# Patient Record
Sex: MALE | Race: WHITE | NOT HISPANIC OR LATINO | URBAN - METROPOLITAN AREA
[De-identification: names, ages, dates, MRNs, and addresses within clinical notes are randomized per-mention and may not be internally consistent; named-entity substitution may affect disease eponyms.]

---

## 2023-04-24 LAB
ALANINE AMINOTRANSFERASE (SGPT) (U/L) IN SER/PLAS: 24 U/L (ref 10–52)
ALBUMIN (G/DL) IN SER/PLAS: 4.3 G/DL (ref 3.4–5)
ALKALINE PHOSPHATASE (U/L) IN SER/PLAS: 60 U/L (ref 33–120)
ANION GAP IN SER/PLAS: 12 MMOL/L (ref 10–20)
APPEARANCE, URINE: CLEAR
ASPARTATE AMINOTRANSFERASE (SGOT) (U/L) IN SER/PLAS: 15 U/L (ref 9–39)
BASOPHILS (10*3/UL) IN BLOOD BY AUTOMATED COUNT: 0.07 X10E9/L (ref 0–0.1)
BASOPHILS/100 LEUKOCYTES IN BLOOD BY AUTOMATED COUNT: 0.9 % (ref 0–2)
BILIRUBIN TOTAL (MG/DL) IN SER/PLAS: 0.6 MG/DL (ref 0–1.2)
BILIRUBIN, URINE: NEGATIVE
BLOOD, URINE: NEGATIVE
CALCIUM (MG/DL) IN SER/PLAS: 9.6 MG/DL (ref 8.6–10.6)
CARBON DIOXIDE, TOTAL (MMOL/L) IN SER/PLAS: 29 MMOL/L (ref 21–32)
CHLORIDE (MMOL/L) IN SER/PLAS: 102 MMOL/L (ref 98–107)
CHOLESTEROL (MG/DL) IN SER/PLAS: 260 MG/DL (ref 0–199)
CHOLESTEROL IN HDL (MG/DL) IN SER/PLAS: 46.1 MG/DL
CHOLESTEROL/HDL RATIO: 5.6
COLOR, URINE: NORMAL
CREATININE (MG/DL) IN SER/PLAS: 1.06 MG/DL (ref 0.5–1.3)
EOSINOPHILS (10*3/UL) IN BLOOD BY AUTOMATED COUNT: 0.21 X10E9/L (ref 0–0.7)
EOSINOPHILS/100 LEUKOCYTES IN BLOOD BY AUTOMATED COUNT: 2.8 % (ref 0–6)
ERYTHROCYTE DISTRIBUTION WIDTH (RATIO) BY AUTOMATED COUNT: 12.7 % (ref 11.5–14.5)
ERYTHROCYTE MEAN CORPUSCULAR HEMOGLOBIN CONCENTRATION (G/DL) BY AUTOMATED: 32.8 G/DL (ref 32–36)
ERYTHROCYTE MEAN CORPUSCULAR VOLUME (FL) BY AUTOMATED COUNT: 93 FL (ref 80–100)
ERYTHROCYTES (10*6/UL) IN BLOOD BY AUTOMATED COUNT: 5.13 X10E12/L (ref 4.5–5.9)
GFR MALE: 81 ML/MIN/1.73M2
GLUCOSE (MG/DL) IN SER/PLAS: 94 MG/DL (ref 74–99)
GLUCOSE, URINE: NEGATIVE MG/DL
HEMATOCRIT (%) IN BLOOD BY AUTOMATED COUNT: 47.6 % (ref 41–52)
HEMOGLOBIN (G/DL) IN BLOOD: 15.6 G/DL (ref 13.5–17.5)
IMMATURE GRANULOCYTES/100 LEUKOCYTES IN BLOOD BY AUTOMATED COUNT: 0.3 % (ref 0–0.9)
KETONES, URINE: NEGATIVE MG/DL
LDL: 166 MG/DL (ref 0–99)
LEUKOCYTE ESTERASE, URINE: NEGATIVE
LEUKOCYTES (10*3/UL) IN BLOOD BY AUTOMATED COUNT: 7.6 X10E9/L (ref 4.4–11.3)
LYMPHOCYTES (10*3/UL) IN BLOOD BY AUTOMATED COUNT: 1.49 X10E9/L (ref 1.2–4.8)
LYMPHOCYTES/100 LEUKOCYTES IN BLOOD BY AUTOMATED COUNT: 19.6 % (ref 13–44)
MONOCYTES (10*3/UL) IN BLOOD BY AUTOMATED COUNT: 0.73 X10E9/L (ref 0.1–1)
MONOCYTES/100 LEUKOCYTES IN BLOOD BY AUTOMATED COUNT: 9.6 % (ref 2–10)
NEUTROPHILS (10*3/UL) IN BLOOD BY AUTOMATED COUNT: 5.1 X10E9/L (ref 1.2–7.7)
NEUTROPHILS/100 LEUKOCYTES IN BLOOD BY AUTOMATED COUNT: 66.8 % (ref 40–80)
NITRITE, URINE: NEGATIVE
NON HDL CHOLESTEROL: 214 MG/DL
NRBC (PER 100 WBCS) BY AUTOMATED COUNT: 0 /100 WBC (ref 0–0)
PH, URINE: 6 (ref 5–8)
PLATELETS (10*3/UL) IN BLOOD AUTOMATED COUNT: 270 X10E9/L (ref 150–450)
POTASSIUM (MMOL/L) IN SER/PLAS: 5 MMOL/L (ref 3.5–5.3)
PROSTATE SPECIFIC AG (NG/ML) IN SER/PLAS: 4.46 NG/ML (ref 0–4)
PROTEIN TOTAL: 6.7 G/DL (ref 6.4–8.2)
PROTEIN, URINE: NEGATIVE MG/DL
SODIUM (MMOL/L) IN SER/PLAS: 138 MMOL/L (ref 136–145)
SPECIFIC GRAVITY, URINE: 1 (ref 1–1.03)
TRIGLYCERIDE (MG/DL) IN SER/PLAS: 242 MG/DL (ref 0–149)
UREA NITROGEN (MG/DL) IN SER/PLAS: 15 MG/DL (ref 6–23)
UROBILINOGEN, URINE: <2 MG/DL (ref 0–1.9)
VLDL: 48 MG/DL (ref 0–40)

## 2024-01-18 ENCOUNTER — LAB (OUTPATIENT)
Dept: LAB | Facility: LAB | Age: 59
End: 2024-01-18
Payer: COMMERCIAL

## 2024-01-18 DIAGNOSIS — R97.20 ELEVATED PROSTATE SPECIFIC ANTIGEN (PSA): Primary | ICD-10-CM

## 2024-01-18 PROCEDURE — 84154 ASSAY OF PSA FREE: CPT

## 2024-01-18 PROCEDURE — 84153 ASSAY OF PSA TOTAL: CPT

## 2024-01-18 PROCEDURE — 36415 COLL VENOUS BLD VENIPUNCTURE: CPT

## 2024-01-21 LAB
PSA FREE MFR SERPL: 17 %
PSA FREE SERPL-MCNC: 0.6 NG/ML
PSA SERPL IA-MCNC: 3.6 NG/ML (ref 0–4)

## 2024-01-31 NOTE — PROGRESS NOTES
"FUV    Last seen - 7/28/23     HISTORY OF PRESENT ILLNESS:   Micah David is a 58 y.o. male who is being seen today for follow up and PSA results. No Fhx of prostate cancer.    PAST MEDICAL HISTORY:  Past Medical History:   Diagnosis Date    Other conditions influencing health status     Bone Density Studies Dual-Energy X-ray Absorptiometry       PAST SURGICAL HISTORY:  Past Surgical History:   Procedure Laterality Date    OTHER SURGICAL HISTORY  11/18/2014    Open Treatment Of Fracture Of Proximal Femoral Neck        ALLERGIES:   Not on File     MEDICATIONS:   No current outpatient medications     PHYSICAL EXAM:  There were no vitals taken for this visit.  Constitutional: Patient appears well-developed and well-nourished. No distress.    Pulmonary/Chest: Effort normal. No respiratory distress.   Abdominal: Soft, ND NT  : WNL  Musculoskeletal: Normal range of motion.    Neurological: Alert and oriented to person, place, and time.  Psychiatric: Normal mood and affect. Behavior is normal. Thought content normal.      Labs:  No results found for: \"TESTOSTERONE\"  Lab Results   Component Value Date    PSA 3.6 01/18/2024     No components found for: \"CBC\"  Lab Results   Component Value Date    CREATININE 1.06 04/24/2023     No components found for: \"TESTOTMS\"  No results found for: \"TESTF\"    Imaging:  - MRI of prostate on 7/17/23 was negative.   - Results reviewed with patient. Patient reassured.     Discussion:  Doing well, no complaints. Denies any dysuria, hematuria, bothersome urinary frequency, urgency, or flank pain. Patient denies any pushing or straining to urinate. No complaints of ED. Will continue to monitor PSA.    PVR 2cc    Assessment:    No diagnosis found.    Micah David is a 58 y.o. male here for FUV     Plan:   1) Follow up in 6 months with PSA panel prior.  2) All questions and concerns were addressed. Patient verbalizes understanding and has no other questions at this time. "     Scribe Attestation  By signing my name below, I, Krista Brock   attest that this documentation has been prepared under the direction and in the presence of Tomy Royal MD.

## 2024-02-01 ENCOUNTER — OFFICE VISIT (OUTPATIENT)
Dept: UROLOGY | Facility: HOSPITAL | Age: 59
End: 2024-02-01
Payer: COMMERCIAL

## 2024-02-01 DIAGNOSIS — R97.20 ELEVATED PSA: ICD-10-CM

## 2024-02-01 DIAGNOSIS — N40.0 BENIGN PROSTATIC HYPERPLASIA, UNSPECIFIED WHETHER LOWER URINARY TRACT SYMPTOMS PRESENT: Primary | ICD-10-CM

## 2024-02-01 PROCEDURE — 99213 OFFICE O/P EST LOW 20 MIN: CPT | Performed by: UROLOGY

## 2024-07-25 ENCOUNTER — APPOINTMENT (OUTPATIENT)
Dept: UROLOGY | Facility: HOSPITAL | Age: 59
End: 2024-07-25
Payer: COMMERCIAL

## 2024-07-25 ENCOUNTER — LAB (OUTPATIENT)
Dept: LAB | Facility: LAB | Age: 59
End: 2024-07-25
Payer: COMMERCIAL

## 2024-07-25 DIAGNOSIS — E78.2 MIXED HYPERLIPIDEMIA: ICD-10-CM

## 2024-07-25 DIAGNOSIS — E78.2 MIXED HYPERLIPIDEMIA: Primary | ICD-10-CM

## 2024-07-25 LAB
ALBUMIN SERPL BCP-MCNC: 4.3 G/DL (ref 3.4–5)
ALP SERPL-CCNC: 62 U/L (ref 33–120)
ALT SERPL W P-5'-P-CCNC: 24 U/L (ref 10–52)
ANION GAP SERPL CALC-SCNC: 9 MMOL/L (ref 10–20)
AST SERPL W P-5'-P-CCNC: 17 U/L (ref 9–39)
BILIRUB SERPL-MCNC: 0.4 MG/DL (ref 0–1.2)
BUN SERPL-MCNC: 18 MG/DL (ref 6–23)
CALCIUM SERPL-MCNC: 9.2 MG/DL (ref 8.6–10.6)
CHLORIDE SERPL-SCNC: 104 MMOL/L (ref 98–107)
CHOLEST SERPL-MCNC: 141 MG/DL (ref 0–199)
CHOLESTEROL/HDL RATIO: 2.9
CO2 SERPL-SCNC: 33 MMOL/L (ref 21–32)
CREAT SERPL-MCNC: 1.11 MG/DL (ref 0.5–1.3)
EGFRCR SERPLBLD CKD-EPI 2021: 76 ML/MIN/1.73M*2
GLUCOSE SERPL-MCNC: 94 MG/DL (ref 74–99)
HDLC SERPL-MCNC: 49.2 MG/DL
LDLC SERPL CALC-MCNC: 74 MG/DL
NON HDL CHOLESTEROL: 92 MG/DL (ref 0–149)
POTASSIUM SERPL-SCNC: 5.2 MMOL/L (ref 3.5–5.3)
PROT SERPL-MCNC: 6.6 G/DL (ref 6.4–8.2)
SODIUM SERPL-SCNC: 141 MMOL/L (ref 136–145)
TRIGL SERPL-MCNC: 88 MG/DL (ref 0–149)
VLDL: 18 MG/DL (ref 0–40)

## 2024-07-25 PROCEDURE — 80053 COMPREHEN METABOLIC PANEL: CPT

## 2024-07-25 PROCEDURE — 36415 COLL VENOUS BLD VENIPUNCTURE: CPT

## 2024-07-25 PROCEDURE — 80061 LIPID PANEL: CPT

## 2024-08-20 ENCOUNTER — LAB (OUTPATIENT)
Dept: LAB | Facility: LAB | Age: 59
End: 2024-08-20
Payer: COMMERCIAL

## 2024-08-20 DIAGNOSIS — R97.20 ELEVATED PSA: ICD-10-CM

## 2024-08-20 PROCEDURE — 36415 COLL VENOUS BLD VENIPUNCTURE: CPT

## 2024-08-20 PROCEDURE — 84153 ASSAY OF PSA TOTAL: CPT

## 2024-08-20 PROCEDURE — 84154 ASSAY OF PSA FREE: CPT

## 2024-08-22 ENCOUNTER — APPOINTMENT (OUTPATIENT)
Dept: UROLOGY | Facility: HOSPITAL | Age: 59
End: 2024-08-22
Payer: COMMERCIAL

## 2024-08-22 NOTE — PROGRESS NOTES
"FUV    Last seen - 2/1/24     HISTORY OF PRESENT ILLNESS:   Micah David is a 58 y.o. male who is being seen today for follow up and PSA results. No Fhx of prostate cancer.    PAST MEDICAL HISTORY:  Past Medical History:   Diagnosis Date    Other conditions influencing health status     Bone Density Studies Dual-Energy X-ray Absorptiometry       PAST SURGICAL HISTORY:  Past Surgical History:   Procedure Laterality Date    OTHER SURGICAL HISTORY  11/18/2014    Open Treatment Of Fracture Of Proximal Femoral Neck        ALLERGIES:   Not on File     MEDICATIONS:   No current outpatient medications     PHYSICAL EXAM:  There were no vitals taken for this visit.  Constitutional: Patient appears well-developed and well-nourished. No distress.    Pulmonary/Chest: Effort normal. No respiratory distress.   Abdominal: Soft, ND NT  : WNL  Musculoskeletal: Normal range of motion.    Neurological: Alert and oriented to person, place, and time.  Psychiatric: Normal mood and affect. Behavior is normal. Thought content normal.      Labs:  No results found for: \"TESTOSTERONE\"  Lab Results   Component Value Date    PSA 5.0 (H) 08/20/2024     No components found for: \"CBC\"  Lab Results   Component Value Date    CREATININE 1.11 07/25/2024     No components found for: \"TESTOTMS\"  No results found for: \"TESTF\"    Imaging:  - MRI of prostate on 7/17/23 was negative.     Discussion:  PSA Results reviewed with patient. Patient reassured.   Doing well, no complaints. Denies any dysuria, hematuria, bothersome urinary frequency, urgency, or flank pain. Patient denies any pushing or straining to urinate. No complaints of ED. Gets an occasional burning sensation when urinating but not frequent or bothersome.Will continue to monitor PSA.Follow up in 6 months with PSApanel prior.      DESI:25, AUA:9  Assessment:    No diagnosis found.    Micah David is a 58 y.o. male here for FUV     Plan:   1) Follow up in 6 months with " Mason prior.  2) All questions and concerns were addressed. Patient verbalizes understanding and has no other questions at this time.     Scribe Attestation  By signing my name below, I,Blancaashley Butleremiliano Conte Scribe   attest that this documentation has been prepared under the direction and in the presence of Tomy Royal MD.

## 2024-08-23 ENCOUNTER — OFFICE VISIT (OUTPATIENT)
Dept: UROLOGY | Facility: HOSPITAL | Age: 59
End: 2024-08-23
Payer: COMMERCIAL

## 2024-08-23 DIAGNOSIS — R97.20 ELEVATED PSA: Primary | ICD-10-CM

## 2024-08-23 LAB
PSA FREE MFR SERPL: 16 %
PSA FREE SERPL-MCNC: 0.8 NG/ML
PSA SERPL IA-MCNC: 5 NG/ML (ref 0–4)

## 2024-08-23 PROCEDURE — 99214 OFFICE O/P EST MOD 30 MIN: CPT | Performed by: UROLOGY

## 2024-08-23 PROCEDURE — G2211 COMPLEX E/M VISIT ADD ON: HCPCS | Performed by: UROLOGY

## 2025-02-04 LAB
PSA FREE MFR SERPL: 13 % (CALC)
PSA FREE SERPL-MCNC: 0.9 NG/ML
PSA SERPL-MCNC: 7 NG/ML

## 2025-02-06 NOTE — PROGRESS NOTES
"Virtual or Telephone Consent    An interactive audio and video telecommunication system which permits real time communications between the patient (at the originating site) and provider (at the distant site) was utilized to provide this telehealth service.   Verbal consent was requested and obtained from Micah David on this date, 02/07/25 for a telehealth visit.   FUV    Last seen - 8/23/24     HISTORY OF PRESENT ILLNESS:   Micah David is a 59 y.o. male who is being seen today for follow up and PSA results. No Fhx of prostate cancer.  -negative MRI on 7/17/23    PAST MEDICAL HISTORY:  Past Medical History:   Diagnosis Date    Other conditions influencing health status     Bone Density Studies Dual-Energy X-ray Absorptiometry       PAST SURGICAL HISTORY:  Past Surgical History:   Procedure Laterality Date    OTHER SURGICAL HISTORY  11/18/2014    Open Treatment Of Fracture Of Proximal Femoral Neck        ALLERGIES:   Not on File     MEDICATIONS:   No current outpatient medications     PHYSICAL EXAM:  There were no vitals taken for this visit.  Constitutional: Patient appears well-developed and well-nourished. No distress.    Pulmonary/Chest: Effort normal. No respiratory distress.   Abdominal: Soft, ND NT  : WNL  Musculoskeletal: Normal range of motion.    Neurological: Alert and oriented to person, place, and time.  Psychiatric: Normal mood and affect. Behavior is normal. Thought content normal.      Labs:  No results found for: \"TESTOSTERONE\"  Lab Results   Component Value Date    PSA 7.0 (H) 02/03/2025   PSA trend:  7 with 13% free on 2/3/25  5 with 16% free on 8/20/24  3.6 with 17% free on 1/18/24  4.46 on 4/24/23  Lab Results   Component Value Date    PSA 7.0 (H) 02/03/2025    PSA 5.0 (H) 08/20/2024    PSA 3.6 01/18/2024    PSA 4.46 (H) 04/24/2023    PSA 2.70 01/21/2022    PSA 5.75 (H) 12/14/2021       No components found for: \"CBC\"  Lab Results   Component Value Date    CREATININE 1.11 " "07/25/2024     No components found for: \"TESTOTMS\"  No results found for: \"TESTF\"    Imaging:  - MRI of prostate on 7/17/23 was negative.     Discussion:  PSA Results reviewed with patient. Patient reassured. Denies any dysuria, hematuria, bothersome urinary frequency, urgency, or flank pain. Does feel like he is not completely emptying his bladder. Patient denies any pushing or straining to urinate. Declines any meds at this time. No complaints of ED. Gets an occasional burning sensation when urinating but not frequent or bothersome. With jump in PSA, recommend doing a prostate MRI and follow up in person to check PVR in office. Pt in agreement with plan.     DESI:25, AUA:9    Assessment:    No diagnosis found.    Micah David is a 59 y.o. male here for FUV     Plan:   1) prostate MRI and fu with results  2) All questions and concerns were addressed. Patient verbalizes understanding and has no other questions at this time.     Scribe Attestation  By signing my name below, IBlanca Scribe   attest that this documentation has been prepared under the direction and in the presence of Tomy Royal MD.  "

## 2025-02-07 ENCOUNTER — TELEMEDICINE (OUTPATIENT)
Dept: UROLOGY | Facility: HOSPITAL | Age: 60
End: 2025-02-07
Payer: COMMERCIAL

## 2025-02-07 DIAGNOSIS — R97.20 ELEVATED PSA: ICD-10-CM

## 2025-02-07 PROCEDURE — G2211 COMPLEX E/M VISIT ADD ON: HCPCS | Performed by: UROLOGY

## 2025-02-07 PROCEDURE — 99214 OFFICE O/P EST MOD 30 MIN: CPT | Performed by: UROLOGY

## 2025-02-17 ENCOUNTER — APPOINTMENT (OUTPATIENT)
Dept: RADIOLOGY | Facility: HOSPITAL | Age: 60
End: 2025-02-17
Payer: COMMERCIAL

## 2025-03-05 ENCOUNTER — HOSPITAL ENCOUNTER (OUTPATIENT)
Dept: RADIOLOGY | Facility: HOSPITAL | Age: 60
Discharge: HOME | End: 2025-03-05
Payer: COMMERCIAL

## 2025-03-05 VITALS — BODY MASS INDEX: 27.55 KG/M2 | WEIGHT: 175.93 LBS

## 2025-03-05 DIAGNOSIS — R97.20 ELEVATED PSA: ICD-10-CM

## 2025-03-05 PROCEDURE — 2550000001 HC RX 255 CONTRASTS: Performed by: UROLOGY

## 2025-03-05 PROCEDURE — 72197 MRI PELVIS W/O & W/DYE: CPT

## 2025-03-05 PROCEDURE — A9575 INJ GADOTERATE MEGLUMI 0.1ML: HCPCS | Performed by: UROLOGY

## 2025-03-05 RX ORDER — GADOTERATE MEGLUMINE 376.9 MG/ML
16 INJECTION INTRAVENOUS
Status: COMPLETED | OUTPATIENT
Start: 2025-03-05 | End: 2025-03-05

## 2025-03-05 RX ADMIN — GADOTERATE MEGLUMINE 16 ML: 376.9 INJECTION INTRAVENOUS at 09:26

## 2025-03-20 ENCOUNTER — TELEMEDICINE (OUTPATIENT)
Dept: UROLOGY | Facility: HOSPITAL | Age: 60
End: 2025-03-20
Payer: COMMERCIAL

## 2025-03-20 DIAGNOSIS — R97.20 ELEVATED PSA: Primary | ICD-10-CM

## 2025-03-20 PROCEDURE — G2211 COMPLEX E/M VISIT ADD ON: HCPCS | Performed by: UROLOGY

## 2025-03-20 PROCEDURE — 99214 OFFICE O/P EST MOD 30 MIN: CPT | Performed by: UROLOGY

## 2025-03-20 NOTE — PROGRESS NOTES
"Virtual or Telephone Consent    An interactive audio and video telecommunication system which permits real time communications between the patient (at the originating site) and provider (at the distant site) was utilized to provide this telehealth service.   Verbal consent was requested and obtained from Micah David on this date, 03/20/25 for a telehealth visit.   FUV    Last seen - 2/7/25     HISTORY OF PRESENT ILLNESS:   Micah David is a 59 y.o. male who is being seen today for follow up to review prostate MRI results  Elevated PSA. No Fhx of prostate cancer.  -negative MRI on 7/17/23  -Prostate MRI, 3/5/25: BPH changes (PI-RADS 2)    PAST MEDICAL HISTORY:  Past Medical History:   Diagnosis Date    Other conditions influencing health status     Bone Density Studies Dual-Energy X-ray Absorptiometry       PAST SURGICAL HISTORY:  Past Surgical History:   Procedure Laterality Date    OTHER SURGICAL HISTORY  11/18/2014    Open Treatment Of Fracture Of Proximal Femoral Neck        ALLERGIES:   No Known Allergies     MEDICATIONS:   No current outpatient medications     PHYSICAL EXAM:  There were no vitals taken for this visit.  Constitutional: Patient appears well-developed and well-nourished. No distress.    Pulmonary/Chest: Effort normal. No respiratory distress.   Abdominal: Soft, ND NT  : WNL  Musculoskeletal: Normal range of motion.    Neurological: Alert and oriented to person, place, and time.  Psychiatric: Normal mood and affect. Behavior is normal. Thought content normal.      Labs:  No results found for: \"TESTOSTERONE\"  Lab Results   Component Value Date    PSA 7.0 (H) 02/03/2025   PSA trend:  7 with 13% free on 2/3/25  5 with 16% free on 8/20/24  3.6 with 17% free on 1/18/24  4.46 on 4/24/23  Lab Results   Component Value Date    PSA 7.0 (H) 02/03/2025    PSA 5.0 (H) 08/20/2024    PSA 3.6 01/18/2024    PSA 4.46 (H) 04/24/2023    PSA 2.70 01/21/2022    PSA 5.75 (H) 12/14/2021       No " "components found for: \"CBC\"  Lab Results   Component Value Date    CREATININE 1.11 07/25/2024     No components found for: \"TESTOTMS\"  No results found for: \"TESTF\"    Imaging:  - MRI of prostate on 7/17/23 was negative.     Prostate MRI, 3/5/25: BPH changes (PI-RADS 2)    Discussion:  MRI Results reviewed with patient. Patient reassured. Denies any dysuria, hematuria, bothersome urinary frequency, urgency, or flank pain. Does feel like he is not completely emptying his bladder. Patient denies any pushing or straining to urinate. Declines any meds at this time. No complaints of ED. Gets an occasional burning sensation when urinating but not frequent or bothersome. Discussed doing a biopsy vs monitoring PSA given his consistently elevated PSA. Pt in agreement. Risks, benefits, alternatives discussed. Will be scheduled for a biopsy in-office.     DESI:25, AUA:9    Assessment:    No diagnosis found.    Micah David is a 59 y.o. male here for FUV     Plan:   1) schedule prostate biopsy and a fu 2 weeks after that to discuss results  2) All questions and concerns were addressed. Patient verbalizes understanding and has no other questions at this time.     Scribe Attestation  By signing my name below, IBlanca Scribe   attest that this documentation has been prepared under the direction and in the presence of Tomy Royal MD.  "

## 2025-03-27 ENCOUNTER — TELEPHONE (OUTPATIENT)
Dept: UROLOGY | Facility: HOSPITAL | Age: 60
End: 2025-03-27
Payer: COMMERCIAL

## 2025-04-28 RX ORDER — CEFTRIAXONE 1 G/1
1 INJECTION, POWDER, FOR SOLUTION INTRAMUSCULAR; INTRAVENOUS ONCE
Status: COMPLETED | OUTPATIENT
Start: 2025-05-08 | End: 2025-05-08

## 2025-05-07 NOTE — PROGRESS NOTES
"  FUV    Last seen - 3/20/25     HISTORY OF PRESENT ILLNESS:   Micah David is a 59 y.o. male who is being seen today for prostate biopsy  Elevated PSA. No Fhx of prostate cancer.  -negative MRI on 7/17/23  -Prostate MRI, 3/5/25: BPH changes (PI-RADS 2)    PAST MEDICAL HISTORY:  Past Medical History:   Diagnosis Date    Other conditions influencing health status     Bone Density Studies Dual-Energy X-ray Absorptiometry       PAST SURGICAL HISTORY:  Past Surgical History:   Procedure Laterality Date    OTHER SURGICAL HISTORY  11/18/2014    Open Treatment Of Fracture Of Proximal Femoral Neck        ALLERGIES:   No Known Allergies     MEDICATIONS:   No current outpatient medications     PHYSICAL EXAM:  Blood pressure (!) 139/93, pulse 69.  Constitutional: Patient appears well-developed and well-nourished. No distress.    Pulmonary/Chest: Effort normal. No respiratory distress.   Abdominal: Soft, ND NT  : WNL  Musculoskeletal: Normal range of motion.    Neurological: Alert and oriented to person, place, and time.  Psychiatric: Normal mood and affect. Behavior is normal. Thought content normal.      Labs:  No results found for: \"TESTOSTERONE\"  Lab Results   Component Value Date    PSA 7.0 (H) 02/03/2025   PSA trend:  7 with 13% free on 2/3/25  5 with 16% free on 8/20/24  3.6 with 17% free on 1/18/24  4.46 on 4/24/23  Lab Results   Component Value Date    PSA 7.0 (H) 02/03/2025    PSA 5.0 (H) 08/20/2024    PSA 3.6 01/18/2024    PSA 4.46 (H) 04/24/2023    PSA 2.70 01/21/2022    PSA 5.75 (H) 12/14/2021       No components found for: \"CBC\"  Lab Results   Component Value Date    CREATININE 1.11 07/25/2024     No components found for: \"TESTOTMS\"  No results found for: \"TESTF\"    Imaging:  - MRI of prostate on 7/17/23 was negative.     Prostate MRI, 3/5/25: BPH changes (PI-RADS 2)    Discussion:  Doing well, no complaints. A standard biopsy was completed today without complications and he tolerated the procedure " well.  Prostate volume: 62.2g. Post-biopsy care instructions were provided to patient. Will follow up in 2 weeks to discuss biopsy results.      DESI:25, AUA:9    Assessment:      1. Elevated PSA  Surgical Pathology Exam    Surgical Pathology Exam    POCT UA Automated manually resulted      2. Prophylactic antibiotic  cefTRIAXone (Rocephin) vial 1 g          Micah David is a 59 y.o. male here for FUV     Plan:   1)  fu 2 weeks after to discuss biopsy results   2) All questions and concerns were addressed. Patient verbalizes understanding and has no other questions at this time.     Scribe Attestation  By signing my name below, IBlanca Scribe   attest that this documentation has been prepared under the direction and in the presence of Tomy Royal MD.

## 2025-05-08 ENCOUNTER — PROCEDURE VISIT (OUTPATIENT)
Dept: UROLOGY | Facility: HOSPITAL | Age: 60
End: 2025-05-08
Payer: COMMERCIAL

## 2025-05-08 VITALS — SYSTOLIC BLOOD PRESSURE: 139 MMHG | DIASTOLIC BLOOD PRESSURE: 93 MMHG | HEART RATE: 69 BPM

## 2025-05-08 DIAGNOSIS — R97.20 ELEVATED PSA: ICD-10-CM

## 2025-05-08 DIAGNOSIS — Z79.2 PROPHYLACTIC ANTIBIOTIC: ICD-10-CM

## 2025-05-08 LAB
POC APPEARANCE, URINE: CLEAR
POC BILIRUBIN, URINE: NEGATIVE
POC BLOOD, URINE: NEGATIVE
POC COLOR, URINE: YELLOW
POC GLUCOSE, URINE: NEGATIVE MG/DL
POC KETONES, URINE: NEGATIVE MG/DL
POC LEUKOCYTES, URINE: NEGATIVE
POC NITRITE,URINE: NEGATIVE
POC PH, URINE: 6.5 PH
POC PROTEIN, URINE: NEGATIVE MG/DL
POC SPECIFIC GRAVITY, URINE: 1.01
POC UROBILINOGEN, URINE: 0.2 EU/DL

## 2025-05-08 PROCEDURE — 96372 THER/PROPH/DIAG INJ SC/IM: CPT | Performed by: UROLOGY

## 2025-05-08 PROCEDURE — 55700 HC BIOPSY OF PROSTATE,NEEDLE/PUNCH: CPT | Performed by: UROLOGY

## 2025-05-08 PROCEDURE — 81003 URINALYSIS AUTO W/O SCOPE: CPT | Mod: QW | Performed by: UROLOGY

## 2025-05-08 PROCEDURE — 2500000004 HC RX 250 GENERAL PHARMACY W/ HCPCS (ALT 636 FOR OP/ED): Mod: JZ | Performed by: UROLOGY

## 2025-05-08 PROCEDURE — 76942 ECHO GUIDE FOR BIOPSY: CPT | Performed by: UROLOGY

## 2025-05-08 PROCEDURE — 76872 US TRANSRECTAL: CPT | Performed by: UROLOGY

## 2025-05-08 PROCEDURE — 55700 PR PROSTATE NEEDLE BIOPSY ANY APPROACH: CPT | Performed by: UROLOGY

## 2025-05-08 RX ADMIN — CEFTRIAXONE 1 G: 1 INJECTION, POWDER, FOR SOLUTION INTRAMUSCULAR; INTRAVENOUS at 09:47

## 2025-05-20 LAB
LABORATORY COMMENT REPORT: NORMAL
PATH REPORT.FINAL DX SPEC: NORMAL
PATH REPORT.GROSS SPEC: NORMAL
PATH REPORT.RELEVANT HX SPEC: NORMAL
PATH REPORT.TOTAL CANCER: NORMAL

## 2025-05-21 NOTE — PROGRESS NOTES
"Virtual or Telephone Consent    An interactive audio and video telecommunication system which permits real time communications between the patient (at the originating site) and provider (at the distant site) was utilized to provide this telehealth service.   Verbal consent was requested and obtained from Micah David on this date, 05/22/25 for a telehealth visit and the patient's location was confirmed at the time of the visit.  FUV    Last seen - 5/8/25     HISTORY OF PRESENT ILLNESS:   Micah David is a 59 y.o. male who is being seen today for prostate biopsy results  Elevated PSA. No Fhx of prostate cancer.  -negative MRI on 7/17/23  -Prostate MRI, 3/5/25: BPH changes (PI-RADS 2)  -negative biopsy 5/8/25     PAST MEDICAL HISTORY:  Past Medical History:   Diagnosis Date    Other conditions influencing health status     Bone Density Studies Dual-Energy X-ray Absorptiometry       PAST SURGICAL HISTORY:  Past Surgical History:   Procedure Laterality Date    OTHER SURGICAL HISTORY  11/18/2014    Open Treatment Of Fracture Of Proximal Femoral Neck        ALLERGIES:   No Known Allergies     MEDICATIONS:   No current outpatient medications     PHYSICAL EXAM:  There were no vitals taken for this visit.  Constitutional: Patient appears well-developed and well-nourished. No distress.    Pulmonary/Chest: Effort normal. No respiratory distress.   Abdominal: Soft, ND NT  : WNL  Musculoskeletal: Normal range of motion.    Neurological: Alert and oriented to person, place, and time.  Psychiatric: Normal mood and affect. Behavior is normal. Thought content normal.      Labs:  No results found for: \"TESTOSTERONE\"  Lab Results   Component Value Date    PSA 7.0 (H) 02/03/2025   PSA trend:  7 with 13% free on 2/3/25  5 with 16% free on 8/20/24  3.6 with 17% free on 1/18/24  4.46 on 4/24/23  Lab Results   Component Value Date    PSA 7.0 (H) 02/03/2025    PSA 5.0 (H) 08/20/2024    PSA 3.6 01/18/2024    PSA 4.46 (H) " "04/24/2023    PSA 2.70 01/21/2022    PSA 5.75 (H) 12/14/2021       No components found for: \"CBC\"  Lab Results   Component Value Date    CREATININE 1.11 07/25/2024     No components found for: \"TESTOTMS\"  No results found for: \"TESTF\"    Imaging:  - MRI of prostate on 7/17/23 was negative.     Prostate MRI, 3/5/25: BPH changes (PI-RADS 2)    Surgical pathology 5/8/25 biopsy: Benign prostatic tissue     Discussion: Results reviewed with patient. Patient reassured that pathology came back negative. We will continue to monitor his PSA. Fu in 6 months with PSA prior.    DESI:25, AUA:9    Assessment:      1. Elevated PSA  PSA, Total and Free    PSA, Total and Free            Micah David is a 59 y.o. male here for FUV     Plan:   1) Fu in 6 months with PSA prior  2) All questions and concerns were addressed. Patient verbalizes understanding and has no other questions at this time.     Scribe Attestation  By signing my name below, IBlanca Scribe   attest that this documentation has been prepared under the direction and in the presence of Tomy Royal MD.  "

## 2025-05-22 ENCOUNTER — APPOINTMENT (OUTPATIENT)
Dept: UROLOGY | Facility: HOSPITAL | Age: 60
End: 2025-05-22
Payer: COMMERCIAL

## 2025-05-22 DIAGNOSIS — R97.20 ELEVATED PSA: ICD-10-CM

## 2025-05-22 PROCEDURE — 99214 OFFICE O/P EST MOD 30 MIN: CPT | Performed by: UROLOGY

## 2025-11-07 ENCOUNTER — APPOINTMENT (OUTPATIENT)
Dept: UROLOGY | Facility: HOSPITAL | Age: 60
End: 2025-11-07
Payer: COMMERCIAL

## 2026-01-09 ENCOUNTER — APPOINTMENT (OUTPATIENT)
Dept: UROLOGY | Facility: HOSPITAL | Age: 61
End: 2026-01-09
Payer: COMMERCIAL